# Patient Record
Sex: FEMALE
[De-identification: names, ages, dates, MRNs, and addresses within clinical notes are randomized per-mention and may not be internally consistent; named-entity substitution may affect disease eponyms.]

---
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
## 2021-09-29 PROBLEM — Z00.00 ENCOUNTER FOR PREVENTIVE HEALTH EXAMINATION: Status: ACTIVE | Noted: 2021-09-29

## 2021-11-18 ENCOUNTER — APPOINTMENT (OUTPATIENT)
Dept: OBGYN | Facility: CLINIC | Age: 21
End: 2021-11-18
Payer: COMMERCIAL

## 2021-11-18 ENCOUNTER — TRANSCRIPTION ENCOUNTER (OUTPATIENT)
Age: 21
End: 2021-11-18

## 2021-11-18 VITALS
SYSTOLIC BLOOD PRESSURE: 110 MMHG | DIASTOLIC BLOOD PRESSURE: 72 MMHG | BODY MASS INDEX: 31.28 KG/M2 | HEIGHT: 62 IN | WEIGHT: 170 LBS

## 2021-11-18 DIAGNOSIS — E28.2 POLYCYSTIC OVARIAN SYNDROME: ICD-10-CM

## 2021-11-18 PROCEDURE — 99385 PREV VISIT NEW AGE 18-39: CPT

## 2021-11-18 NOTE — PHYSICAL EXAM
[Appropriately responsive] : appropriately responsive [Alert] : alert [No Acute Distress] : no acute distress [Soft] : soft [Non-tender] : non-tender [Non-distended] : non-distended [No HSM] : No HSM [No Lesions] : no lesions [No Mass] : no mass [Examination Of The Breasts] : a normal appearance [No Masses] : no breast masses were palpable [Labia Majora] : normal [Labia Minora] : normal [Normal] : normal [Uterine Adnexae] : normal

## 2021-11-22 LAB
C TRACH RRNA SPEC QL NAA+PROBE: NOT DETECTED
N GONORRHOEA RRNA SPEC QL NAA+PROBE: NOT DETECTED
SOURCE TP AMPLIFICATION: NORMAL

## 2021-11-22 NOTE — END OF VISIT
[FreeTextEntry3] : Documented by Dorothea Mohan acting as scribe for Dr. Don on 11/18/2021 \par \par All Medical record entries made by the Scribe were at my, Dr. Don's, direction and personally dictated by me on 11/18/2021  . I have reviewed the chart and agree that the record accurately reflects my personal performance of the history, physical exam, assessment and plan. I have also personally directed, reviewed, and agreed with the discharge instructions.

## 2021-11-22 NOTE — PLAN
[FreeTextEntry1] : -PAP\par -continue Gemmily OCP, refill sent\par -pt reassured light or absent withdrawal bleed on low dose COCP is normal\par -FU 1 year\par

## 2021-11-22 NOTE — HISTORY OF PRESENT ILLNESS
[Patient reported PAP Smear was normal] : Patient reported PAP Smear was normal [FreeTextEntry1] : 21 year old G0 with a h/o PCOS. She is taking Gemmily OCP.  She gets either a light withdrawal bleed or none. She last saw a GYN in 3/2021 because of irregular periods. She is currently sexually active. She is in a relationship. She uses condoms occasionally. Her sexual orientation is straight. Before she was on the pill her periods were infrequent and painful. She was getting abnormal hair growth and had lab work done that confirmed PCOS. FMHx of breast CA with maternal aunt and thyroid CA with maternal grandmother. 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 She moved home because her school went remote.  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

## 2021-11-24 LAB — CYTOLOGY CVX/VAG DOC THIN PREP: NORMAL

## 2022-10-31 ENCOUNTER — RX RENEWAL (OUTPATIENT)
Age: 22
End: 2022-10-31

## 2023-03-16 ENCOUNTER — RX RENEWAL (OUTPATIENT)
Age: 23
End: 2023-03-16

## 2023-04-21 ENCOUNTER — ASOB RESULT (OUTPATIENT)
Age: 23
End: 2023-04-21

## 2023-04-21 ENCOUNTER — APPOINTMENT (OUTPATIENT)
Dept: OBGYN | Facility: CLINIC | Age: 23
End: 2023-04-21
Payer: COMMERCIAL

## 2023-04-21 VITALS — WEIGHT: 190 LBS | DIASTOLIC BLOOD PRESSURE: 70 MMHG | SYSTOLIC BLOOD PRESSURE: 117 MMHG

## 2023-04-21 DIAGNOSIS — Z01.419 ENCOUNTER FOR GYNECOLOGICAL EXAMINATION (GENERAL) (ROUTINE) W/OUT ABNORMAL FINDINGS: ICD-10-CM

## 2023-04-21 DIAGNOSIS — R10.2 PELVIC AND PERINEAL PAIN: ICD-10-CM

## 2023-04-21 PROCEDURE — 99213 OFFICE O/P EST LOW 20 MIN: CPT | Mod: 25

## 2023-04-21 PROCEDURE — 99395 PREV VISIT EST AGE 18-39: CPT

## 2023-04-21 RX ORDER — NORETHINDRONE ACETATE AND ETHINYL ESTRADIOL, AND FERROUS FUMARATE 1MG-20(24)
1-20 KIT ORAL
Qty: 84 | Refills: 3 | Status: ACTIVE | COMMUNITY
Start: 2021-11-19 | End: 1900-01-01

## 2023-04-23 PROBLEM — R10.2 PELVIC PAIN: Status: ACTIVE | Noted: 2023-04-23

## 2023-04-23 PROBLEM — Z01.419 WOMEN'S ANNUAL ROUTINE GYNECOLOGICAL EXAMINATION: Status: ACTIVE | Noted: 2021-11-18

## 2023-04-23 NOTE — END OF VISIT
[FreeTextEntry2] : I, Arelis Wang, acted as a scribe on behalf of Dr. Miryam Don on 04/21/2023 .\par \par All medical entries made by the scribe were at my, Dr. Miryam Don, direction and personally dictated by me on 04/21/2023. I have reviewed the chart and agree that the record accurately reflects my personal performance of the history, physical exam, assessment and plan. I have also personally directed, reviewed, and agreed with the chart.\par

## 2023-04-23 NOTE — PHYSICAL EXAM
[Appropriately responsive] : appropriately responsive [Alert] : alert [No Acute Distress] : no acute distress [Soft] : soft [Non-tender] : non-tender [Non-distended] : non-distended [No HSM] : No HSM [No Lesions] : no lesions [No Mass] : no mass [Oriented x3] : oriented x3 [Examination Of The Breasts] : a normal appearance [No Masses] : no breast masses were palpable [Labia Majora] : normal [Labia Minora] : normal [Normal] : normal [Uterine Adnexae] : normal [Adnexa Tenderness On The Right] : tender

## 2023-04-23 NOTE — PLAN
[FreeTextEntry1] : 22 y/o with h/o PCOS presents for routine annual GYN exam with intermittent RLQ pain. \par \par -PAP UTD \par -continue OCP\par -pelvic US done today showed polycystic appearance in right ovary, with no masses. Left ovary was non-visualized\par -pt advised to call office if RLQ pain becomes persistent \par -RTO in 1 year\par

## 2023-04-23 NOTE — PROCEDURE
[Pelvic Pain] : pelvic pain [de-identified] : pelvic ultrasound [FreeTextEntry4] : Right ovary had polycystic appearance, no masses. Left ovary was not visualized.

## 2023-04-23 NOTE — HISTORY OF PRESENT ILLNESS
[FreeTextEntry1] : 22 y/o G0 LMP 04/03/2023 with h/o PCOS presents for routine annual GYN exam. Last seen on 11/2021 for annual, negative PAP. At time of last visit, pt was taking Gemmily OCP. Today, pt reports she is doing well on OCP. She c/o intermittent right lower quadrant pain that lasts up to a few minutes. No new medical issues or medications. She is sexually active with her long-time boyfriend. \par \par GYNHx: h/o PCOS\par FMHx: breast CA (maternal aunt), thyroid CA (MGM)\par SocHx: pt finished her ERNESTINE at [employer] in May, and is now working as an  at [employer] [PapSmeardate] : 11/2021 [TextBox_31] : wnl

## 2024-07-29 ENCOUNTER — APPOINTMENT (OUTPATIENT)
Dept: OBGYN | Facility: CLINIC | Age: 24
End: 2024-07-29
Payer: COMMERCIAL

## 2024-07-29 VITALS
SYSTOLIC BLOOD PRESSURE: 117 MMHG | WEIGHT: 242 LBS | BODY MASS INDEX: 42.88 KG/M2 | HEIGHT: 63 IN | DIASTOLIC BLOOD PRESSURE: 79 MMHG

## 2024-07-29 DIAGNOSIS — Z01.419 ENCOUNTER FOR GYNECOLOGICAL EXAMINATION (GENERAL) (ROUTINE) W/OUT ABNORMAL FINDINGS: ICD-10-CM

## 2024-07-29 PROCEDURE — 99395 PREV VISIT EST AGE 18-39: CPT

## 2024-07-29 NOTE — HISTORY OF PRESENT ILLNESS
[FreeTextEntry1] : 25y/o G0  for routine annual GYN exam. Last seen on April 2023 for annual. Pt had negative pap/hpv 11/2021. Pt is taking Gemmily OCP. Pt reports she ran out of OCP a month ago. Pt is just ending her period. Pt requested a referral to endocrinologist due to weight gain associated with h/o PCOS. She is sexually active with her long-time boyfriend.   GYNHx: h/o PCOS FMHx: breast CA (maternal aunt), thyroid CA (MGM) SocHx:Pt is an

## 2024-07-29 NOTE — END OF VISIT
[FreeTextEntry3] :   I, Rachael Joy, acted as a scribe on behalf of Dr. Miryam Don M.D  on 07/29/2024.   All medical entries made by the scribe were at my, Dr. Miryam Don M.D ,  direction and personally dictated by me on 07/29/2024. I have reviewed the chart and agree that the record accurately reflects my personal performance of the history, physical exam, assessment and plan. I have also personally directed, reviewed, and agreed with the chart.

## 2024-07-29 NOTE — PLAN
[FreeTextEntry1] : 24 year  old female  presents for annual visit.   -Pap done today, -PT counseled  may be unsatisfactory due to menses  -Refill OCP - Referral to endocrinologist Dr.Maria Kennedy given  RTO 1yr

## 2024-08-03 ENCOUNTER — TRANSCRIPTION ENCOUNTER (OUTPATIENT)
Age: 24
End: 2024-08-03

## 2024-08-03 LAB — CYTOLOGY CVX/VAG DOC THIN PREP: NORMAL

## 2025-04-14 ENCOUNTER — APPOINTMENT (OUTPATIENT)
Dept: ULTRASOUND IMAGING | Facility: CLINIC | Age: 25
End: 2025-04-14
Payer: COMMERCIAL

## 2025-04-14 ENCOUNTER — OUTPATIENT (OUTPATIENT)
Dept: OUTPATIENT SERVICES | Facility: HOSPITAL | Age: 25
LOS: 1 days | End: 2025-04-14
Payer: COMMERCIAL

## 2025-04-14 DIAGNOSIS — R79.89 OTHER SPECIFIED ABNORMAL FINDINGS OF BLOOD CHEMISTRY: ICD-10-CM

## 2025-04-14 PROCEDURE — 76705 ECHO EXAM OF ABDOMEN: CPT | Mod: 26

## 2025-04-14 PROCEDURE — 76705 ECHO EXAM OF ABDOMEN: CPT

## 2025-07-18 ENCOUNTER — APPOINTMENT (OUTPATIENT)
Dept: BARIATRICS/WEIGHT MGMT | Facility: CLINIC | Age: 25
End: 2025-07-18
Payer: COMMERCIAL

## 2025-07-18 ENCOUNTER — OUTPATIENT (OUTPATIENT)
Dept: OUTPATIENT SERVICES | Facility: HOSPITAL | Age: 25
LOS: 1 days | End: 2025-07-18

## 2025-07-18 DIAGNOSIS — I10 ESSENTIAL (PRIMARY) HYPERTENSION: ICD-10-CM

## 2025-07-18 PROBLEM — E66.9 ADULT-ONSET OBESITY: Status: ACTIVE | Noted: 2025-07-18

## 2025-07-18 PROBLEM — K76.0 METABOLIC DYSFUNCTION-ASSOCIATED STEATOTIC LIVER DISEASE (MASLD): Status: ACTIVE | Noted: 2025-07-18

## 2025-07-18 PROCEDURE — 99205 OFFICE O/P NEW HI 60 MIN: CPT | Mod: 95

## 2025-07-18 PROCEDURE — G2211 COMPLEX E/M VISIT ADD ON: CPT | Mod: NC,95

## 2025-08-23 ENCOUNTER — NON-APPOINTMENT (OUTPATIENT)
Age: 25
End: 2025-08-23

## 2025-08-27 ENCOUNTER — OUTPATIENT (OUTPATIENT)
Dept: OUTPATIENT SERVICES | Facility: HOSPITAL | Age: 25
LOS: 1 days | End: 2025-08-27
Payer: COMMERCIAL

## 2025-08-27 ENCOUNTER — APPOINTMENT (OUTPATIENT)
Dept: ULTRASOUND IMAGING | Facility: CLINIC | Age: 25
End: 2025-08-27
Payer: COMMERCIAL

## 2025-08-27 DIAGNOSIS — M79.89 OTHER SPECIFIED SOFT TISSUE DISORDERS: ICD-10-CM

## 2025-08-27 PROCEDURE — 93970 EXTREMITY STUDY: CPT | Mod: 26

## 2025-08-27 PROCEDURE — 93970 EXTREMITY STUDY: CPT

## 2025-09-05 ENCOUNTER — OUTPATIENT (OUTPATIENT)
Dept: OUTPATIENT SERVICES | Facility: HOSPITAL | Age: 25
LOS: 1 days | End: 2025-09-05

## 2025-09-05 ENCOUNTER — APPOINTMENT (OUTPATIENT)
Dept: BARIATRICS/WEIGHT MGMT | Facility: CLINIC | Age: 25
End: 2025-09-05
Payer: COMMERCIAL

## 2025-09-05 DIAGNOSIS — I10 ESSENTIAL (PRIMARY) HYPERTENSION: ICD-10-CM

## 2025-09-05 DIAGNOSIS — E66.9 OBESITY, UNSPECIFIED: ICD-10-CM

## 2025-09-05 DIAGNOSIS — E28.2 POLYCYSTIC OVARIAN SYNDROME: ICD-10-CM

## 2025-09-05 PROCEDURE — G2211 COMPLEX E/M VISIT ADD ON: CPT | Mod: NC,95

## 2025-09-05 PROCEDURE — 99213 OFFICE O/P EST LOW 20 MIN: CPT | Mod: 95

## 2025-09-17 DIAGNOSIS — E66.9 OBESITY, UNSPECIFIED: ICD-10-CM
